# Patient Record
Sex: FEMALE | Race: ASIAN | NOT HISPANIC OR LATINO | URBAN - METROPOLITAN AREA
[De-identification: names, ages, dates, MRNs, and addresses within clinical notes are randomized per-mention and may not be internally consistent; named-entity substitution may affect disease eponyms.]

---

## 2017-04-27 ENCOUNTER — INPATIENT (INPATIENT)
Facility: HOSPITAL | Age: 77
LOS: 1 days | Discharge: HOME | End: 2017-04-29
Attending: INTERNAL MEDICINE | Admitting: INTERNAL MEDICINE
Payer: MEDICARE

## 2017-04-27 VITALS
RESPIRATION RATE: 17 BRPM | TEMPERATURE: 99 F | HEART RATE: 78 BPM | OXYGEN SATURATION: 98 % | SYSTOLIC BLOOD PRESSURE: 121 MMHG | DIASTOLIC BLOOD PRESSURE: 58 MMHG

## 2017-04-27 LAB
ALBUMIN SERPL ELPH-MCNC: 4 G/DL — SIGNIFICANT CHANGE UP (ref 3.3–5)
ALP SERPL-CCNC: 60 U/L — SIGNIFICANT CHANGE UP (ref 40–120)
ALT FLD-CCNC: 13 U/L — SIGNIFICANT CHANGE UP (ref 4–33)
APTT BLD: 29.8 SEC — SIGNIFICANT CHANGE UP (ref 27.5–37.4)
AST SERPL-CCNC: 22 U/L — SIGNIFICANT CHANGE UP (ref 4–32)
BASOPHILS # BLD AUTO: 0.02 K/UL — SIGNIFICANT CHANGE UP (ref 0–0.2)
BASOPHILS NFR BLD AUTO: 0.1 % — SIGNIFICANT CHANGE UP (ref 0–2)
BILIRUB SERPL-MCNC: 0.2 MG/DL — SIGNIFICANT CHANGE UP (ref 0.2–1.2)
BUN SERPL-MCNC: 19 MG/DL — SIGNIFICANT CHANGE UP (ref 7–23)
CALCIUM SERPL-MCNC: 9.2 MG/DL — SIGNIFICANT CHANGE UP (ref 8.4–10.5)
CHLORIDE SERPL-SCNC: 102 MMOL/L — SIGNIFICANT CHANGE UP (ref 98–107)
CK MB BLD-MCNC: 1.43 NG/ML — SIGNIFICANT CHANGE UP (ref 1–4.7)
CO2 SERPL-SCNC: 25 MMOL/L — SIGNIFICANT CHANGE UP (ref 22–31)
CREAT SERPL-MCNC: 0.88 MG/DL — SIGNIFICANT CHANGE UP (ref 0.5–1.3)
EOSINOPHIL # BLD AUTO: 0.23 K/UL — SIGNIFICANT CHANGE UP (ref 0–0.5)
EOSINOPHIL NFR BLD AUTO: 1.7 % — SIGNIFICANT CHANGE UP (ref 0–6)
GLUCOSE SERPL-MCNC: 184 MG/DL — HIGH (ref 70–99)
HCT VFR BLD CALC: 39.7 % — SIGNIFICANT CHANGE UP (ref 34.5–45)
HGB BLD-MCNC: 12.9 G/DL — SIGNIFICANT CHANGE UP (ref 11.5–15.5)
IMM GRANULOCYTES NFR BLD AUTO: 0.4 % — SIGNIFICANT CHANGE UP (ref 0–1.5)
INR BLD: 1.1 — SIGNIFICANT CHANGE UP (ref 0.88–1.17)
LYMPHOCYTES # BLD AUTO: 2.95 K/UL — SIGNIFICANT CHANGE UP (ref 1–3.3)
LYMPHOCYTES # BLD AUTO: 21.9 % — SIGNIFICANT CHANGE UP (ref 13–44)
MCHC RBC-ENTMCNC: 25.8 PG — LOW (ref 27–34)
MCHC RBC-ENTMCNC: 32.5 % — SIGNIFICANT CHANGE UP (ref 32–36)
MCV RBC AUTO: 79.4 FL — LOW (ref 80–100)
MONOCYTES # BLD AUTO: 0.85 K/UL — SIGNIFICANT CHANGE UP (ref 0–0.9)
MONOCYTES NFR BLD AUTO: 6.3 % — SIGNIFICANT CHANGE UP (ref 2–14)
NEUTROPHILS # BLD AUTO: 9.4 K/UL — HIGH (ref 1.8–7.4)
NEUTROPHILS NFR BLD AUTO: 69.6 % — SIGNIFICANT CHANGE UP (ref 43–77)
PLATELET # BLD AUTO: 309 K/UL — SIGNIFICANT CHANGE UP (ref 150–400)
PMV BLD: 10.1 FL — SIGNIFICANT CHANGE UP (ref 7–13)
POTASSIUM SERPL-MCNC: 4.3 MMOL/L — SIGNIFICANT CHANGE UP (ref 3.5–5.3)
POTASSIUM SERPL-SCNC: 4.3 MMOL/L — SIGNIFICANT CHANGE UP (ref 3.5–5.3)
PROT SERPL-MCNC: 7.1 G/DL — SIGNIFICANT CHANGE UP (ref 6–8.3)
PROTHROM AB SERPL-ACNC: 12.4 SEC — SIGNIFICANT CHANGE UP (ref 9.8–13.1)
RBC # BLD: 5 M/UL — SIGNIFICANT CHANGE UP (ref 3.8–5.2)
RBC # FLD: 15.9 % — HIGH (ref 10.3–14.5)
SODIUM SERPL-SCNC: 142 MMOL/L — SIGNIFICANT CHANGE UP (ref 135–145)
TROPONIN T SERPL-MCNC: < 0.06 NG/ML — SIGNIFICANT CHANGE UP (ref 0–0.06)
WBC # BLD: 13.5 K/UL — HIGH (ref 3.8–10.5)
WBC # FLD AUTO: 13.5 K/UL — HIGH (ref 3.8–10.5)

## 2017-04-27 NOTE — ED ADULT TRIAGE NOTE - CHIEF COMPLAINT QUOTE
Pt brought in by [facility] c/o burning sensation in chest and epigastric pain with vomiting. Pt reports " after eating salmon she began feeling dizzy, weak and having severe mid chest burning.   Pt received 2 baby aspirin, 1 nitro spray and 4 mg Zofran from EMS.  Pt brought in by [facility] c/o burning sensation in chest and epigastric pain with vomiting. Pt reports " after eating salmon she began feeling dizzy, weak and having severe mid chest burning.   Pt received 2 baby aspirin, 1 nitro spray and 4 mg Zofran from EMS.   @5485. Pt c/o worsening chest pain/ burning. Charge RN aware. EKG done.

## 2017-04-27 NOTE — ED ADULT NURSE NOTE - CHIEF COMPLAINT QUOTE
Pt brought in by [facility] c/o burning sensation in chest and epigastric pain with vomiting. Pt reports " after eating salmon she began feeling dizzy, weak and having severe mid chest burning.   Pt received 2 baby aspirin, 1 nitro spray and 4 mg Zofran from EMS.   @0268. Pt c/o worsening chest pain/ burning. Charge RN aware. EKG done.

## 2017-04-27 NOTE — ED ADULT NURSE NOTE - OBJECTIVE STATEMENT
Penny RN:. 77 yo female received in spot 17.  Pt is A&Ox4.  Pt BIBA c/o burning sensation in chest.  Pt states she has never had pain like this.  Pt reports "after eating salmon she began feeling dizzy, weak and having severe mid chest burning."  Other family members ate same food with no symptoms.  Pt was seen at [facility] 4 weeks ago with "infected diverticula."  as per pt son, pt developed cdiff from abx.  pt had stool sample test this week at PCP that showed no cdiff.  Pt denies any diarrhea at this time.  Pt received 2 baby aspirin, 1 nitro spray and 4 mg Zofran from EMS.  rpt to prim RN

## 2017-04-28 DIAGNOSIS — E11.9 TYPE 2 DIABETES MELLITUS WITHOUT COMPLICATIONS: ICD-10-CM

## 2017-04-28 DIAGNOSIS — I24.9 ACUTE ISCHEMIC HEART DISEASE, UNSPECIFIED: ICD-10-CM

## 2017-04-28 DIAGNOSIS — Z79.899 OTHER LONG TERM (CURRENT) DRUG THERAPY: ICD-10-CM

## 2017-04-28 DIAGNOSIS — K21.9 GASTRO-ESOPHAGEAL REFLUX DISEASE WITHOUT ESOPHAGITIS: ICD-10-CM

## 2017-04-28 DIAGNOSIS — R07.9 CHEST PAIN, UNSPECIFIED: ICD-10-CM

## 2017-04-28 DIAGNOSIS — Z29.9 ENCOUNTER FOR PROPHYLACTIC MEASURES, UNSPECIFIED: ICD-10-CM

## 2017-04-28 DIAGNOSIS — I10 ESSENTIAL (PRIMARY) HYPERTENSION: ICD-10-CM

## 2017-04-28 DIAGNOSIS — E78.5 HYPERLIPIDEMIA, UNSPECIFIED: ICD-10-CM

## 2017-04-28 LAB
BUN SERPL-MCNC: 26 MG/DL — HIGH (ref 7–23)
CALCIUM SERPL-MCNC: 8.8 MG/DL — SIGNIFICANT CHANGE UP (ref 8.4–10.5)
CHLORIDE SERPL-SCNC: 102 MMOL/L — SIGNIFICANT CHANGE UP (ref 98–107)
CHOLEST SERPL-MCNC: 176 MG/DL — SIGNIFICANT CHANGE UP (ref 120–199)
CK MB BLD-MCNC: 1.34 NG/ML — SIGNIFICANT CHANGE UP (ref 1–4.7)
CK SERPL-CCNC: 33 U/L — SIGNIFICANT CHANGE UP (ref 25–170)
CO2 SERPL-SCNC: 22 MMOL/L — SIGNIFICANT CHANGE UP (ref 22–31)
CREAT SERPL-MCNC: 0.81 MG/DL — SIGNIFICANT CHANGE UP (ref 0.5–1.3)
GLUCOSE SERPL-MCNC: 130 MG/DL — HIGH (ref 70–99)
HBA1C BLD-MCNC: 7.1 % — HIGH (ref 4–5.6)
HCT VFR BLD CALC: 37.7 % — SIGNIFICANT CHANGE UP (ref 34.5–45)
HDLC SERPL-MCNC: 42 MG/DL — LOW (ref 45–65)
HGB BLD-MCNC: 12.3 G/DL — SIGNIFICANT CHANGE UP (ref 11.5–15.5)
LIPID PNL WITH DIRECT LDL SERPL: 116 MG/DL — SIGNIFICANT CHANGE UP
MCHC RBC-ENTMCNC: 25.9 PG — LOW (ref 27–34)
MCHC RBC-ENTMCNC: 32.6 % — SIGNIFICANT CHANGE UP (ref 32–36)
MCV RBC AUTO: 79.5 FL — LOW (ref 80–100)
PLATELET # BLD AUTO: 300 K/UL — SIGNIFICANT CHANGE UP (ref 150–400)
PMV BLD: 10.3 FL — SIGNIFICANT CHANGE UP (ref 7–13)
POTASSIUM SERPL-MCNC: 4.3 MMOL/L — SIGNIFICANT CHANGE UP (ref 3.5–5.3)
POTASSIUM SERPL-SCNC: 4.3 MMOL/L — SIGNIFICANT CHANGE UP (ref 3.5–5.3)
RBC # BLD: 4.74 M/UL — SIGNIFICANT CHANGE UP (ref 3.8–5.2)
RBC # FLD: 15.9 % — HIGH (ref 10.3–14.5)
SODIUM SERPL-SCNC: 140 MMOL/L — SIGNIFICANT CHANGE UP (ref 135–145)
TRIGL SERPL-MCNC: 183 MG/DL — HIGH (ref 10–149)
TROPONIN T SERPL-MCNC: < 0.06 NG/ML — SIGNIFICANT CHANGE UP (ref 0–0.06)
TSH SERPL-MCNC: 1.07 UIU/ML — SIGNIFICANT CHANGE UP (ref 0.27–4.2)
WBC # BLD: 10.7 K/UL — HIGH (ref 3.8–10.5)
WBC # FLD AUTO: 10.7 K/UL — HIGH (ref 3.8–10.5)

## 2017-04-28 PROCEDURE — 71020: CPT | Mod: 26

## 2017-04-28 PROCEDURE — 93458 L HRT ARTERY/VENTRICLE ANGIO: CPT | Mod: 26

## 2017-04-28 RX ORDER — ATENOLOL 25 MG/1
12.5 TABLET ORAL AT BEDTIME
Qty: 0 | Refills: 0 | Status: DISCONTINUED | OUTPATIENT
Start: 2017-04-28 | End: 2017-04-28

## 2017-04-28 RX ORDER — ONDANSETRON 8 MG/1
4 TABLET, FILM COATED ORAL ONCE
Qty: 0 | Refills: 0 | Status: COMPLETED | OUTPATIENT
Start: 2017-04-28 | End: 2017-04-28

## 2017-04-28 RX ORDER — ASPIRIN/CALCIUM CARB/MAGNESIUM 324 MG
81 TABLET ORAL DAILY
Qty: 0 | Refills: 0 | Status: DISCONTINUED | OUTPATIENT
Start: 2017-04-28 | End: 2017-04-29

## 2017-04-28 RX ORDER — LISINOPRIL 2.5 MG/1
10 TABLET ORAL DAILY
Qty: 0 | Refills: 0 | Status: DISCONTINUED | OUTPATIENT
Start: 2017-04-28 | End: 2017-04-29

## 2017-04-28 RX ORDER — INSULIN LISPRO 100/ML
VIAL (ML) SUBCUTANEOUS
Qty: 0 | Refills: 0 | Status: DISCONTINUED | OUTPATIENT
Start: 2017-04-28 | End: 2017-04-29

## 2017-04-28 RX ORDER — DEXTROSE 50 % IN WATER 50 %
12.5 SYRINGE (ML) INTRAVENOUS ONCE
Qty: 0 | Refills: 0 | Status: DISCONTINUED | OUTPATIENT
Start: 2017-04-28 | End: 2017-04-29

## 2017-04-28 RX ORDER — DEXTROSE 50 % IN WATER 50 %
25 SYRINGE (ML) INTRAVENOUS ONCE
Qty: 0 | Refills: 0 | Status: DISCONTINUED | OUTPATIENT
Start: 2017-04-28 | End: 2017-04-29

## 2017-04-28 RX ORDER — DEXTROSE 50 % IN WATER 50 %
1 SYRINGE (ML) INTRAVENOUS ONCE
Qty: 0 | Refills: 0 | Status: DISCONTINUED | OUTPATIENT
Start: 2017-04-28 | End: 2017-04-29

## 2017-04-28 RX ORDER — GLUCAGON INJECTION, SOLUTION 0.5 MG/.1ML
1 INJECTION, SOLUTION SUBCUTANEOUS ONCE
Qty: 0 | Refills: 0 | Status: DISCONTINUED | OUTPATIENT
Start: 2017-04-28 | End: 2017-04-29

## 2017-04-28 RX ORDER — SIMVASTATIN 20 MG/1
40 TABLET, FILM COATED ORAL AT BEDTIME
Qty: 0 | Refills: 0 | Status: DISCONTINUED | OUTPATIENT
Start: 2017-04-28 | End: 2017-04-29

## 2017-04-28 RX ORDER — ATENOLOL 25 MG/1
12.5 TABLET ORAL DAILY
Qty: 0 | Refills: 0 | Status: DISCONTINUED | OUTPATIENT
Start: 2017-04-28 | End: 2017-04-29

## 2017-04-28 RX ORDER — ATENOLOL 25 MG/1
0.5 TABLET ORAL
Qty: 0 | Refills: 0 | COMMUNITY

## 2017-04-28 RX ORDER — SODIUM CHLORIDE 9 MG/ML
1000 INJECTION, SOLUTION INTRAVENOUS
Qty: 0 | Refills: 0 | Status: DISCONTINUED | OUTPATIENT
Start: 2017-04-28 | End: 2017-04-29

## 2017-04-28 RX ORDER — INSULIN LISPRO 100/ML
VIAL (ML) SUBCUTANEOUS AT BEDTIME
Qty: 0 | Refills: 0 | Status: DISCONTINUED | OUTPATIENT
Start: 2017-04-28 | End: 2017-04-29

## 2017-04-28 RX ADMIN — Medication 81 MILLIGRAM(S): at 11:50

## 2017-04-28 RX ADMIN — LISINOPRIL 10 MILLIGRAM(S): 2.5 TABLET ORAL at 05:05

## 2017-04-28 RX ADMIN — ATENOLOL 12.5 MILLIGRAM(S): 25 TABLET ORAL at 06:15

## 2017-04-28 RX ADMIN — ONDANSETRON 4 MILLIGRAM(S): 8 TABLET, FILM COATED ORAL at 00:58

## 2017-04-28 RX ADMIN — SIMVASTATIN 40 MILLIGRAM(S): 20 TABLET, FILM COATED ORAL at 21:20

## 2017-04-28 NOTE — H&P ADULT. - PROBLEM SELECTOR PLAN 1
Admit to telemetry.   Trend CE. Consider ischemic evaluation.   EKG, CXR.  check cbc, tsh, lipid, hemoglobin a1c, bmp with mag and phos.   f/u MD note

## 2017-04-28 NOTE — H&P ADULT. - ASSESSMENT
75 y/o F with h/o DM, HTN, HLD, arthritis, depressiono, GERD prseents to the ED for chest pain. Admit to telemetry to r/o ACS.

## 2017-04-28 NOTE — H&P ADULT. - GASTROINTESTINAL DETAILS
no guarding/bowel sounds normal/normal/no rebound tenderness/nontender/no distention/no masses palpable/soft

## 2017-04-28 NOTE — H&P ADULT. - HISTORY OF PRESENT ILLNESS
75 y/o F with h/o DM, HLD, HTN, Depression , arthritis, GERD presents to the ED for chest pain. Pt states the chest pain when she started eating dinner. Pt states the chest pain is on the midsternal chest radiating to the left side. Pt states it is associated with nausea and sweats. Pt denies LOC, syncope, fever, chills, shortness of breath, abdominal pain, numbness, tingling, dysuria, urinary/bowel incontinence or any other complaints at this time. 75 y/o F with h/o DM, HLD, HTN, Depression , arthritis, GERD presents to the ED for chest pain. Pt states the chest pain when she started eating dinner. Pt states the chest pain is on the midsternal chest radiating to the left side. Pt states it is associated with nausea and sweats. Pt states symptoms lasted around 2 hours. Pt denies LOC, syncope, fever, chills, shortness of breath, abdominal pain, numbness, tingling, dysuria, urinary/bowel incontinence or any other complaints at this time.

## 2017-04-28 NOTE — H&P ADULT. - RS GEN PE MLT RESP DETAILS PC
airway patent/no chest wall tenderness/clear to auscultation bilaterally/breath sounds equal/normal/no intercostal retractions/good air movement/no rhonchi/no rales/no wheezes/respirations non-labored

## 2017-04-28 NOTE — ED PROVIDER NOTE - ATTENDING CONTRIBUTION TO CARE
Attending Note: I have discussed with the resident their history, assessment, physical and examination. I agree with their conclusion and proposed medical plan. I have personally examined and interviewed the patient.

## 2017-04-28 NOTE — ED PROVIDER NOTE - PHYSICAL EXAMINATION
Attending Note: 75 y/o female p/w c/o sudden onset midsternal sharp cp radiating to left chest a/w tachypnea, nausea and diaphoresis/flushing. PMH DM, HTN, HLD, no personal or fam h/o Cardiac dz (neg cath 2013 per pt), no primary cardiologist. Symptoms occurred at a wedding while pt was eating dinner. Other family members ate same meal and had no symptoms. No abd pain, diarrhea, or vomiting. First episode. Received asa x 2, nitro by ems with subsequent improvement of symptoms. Symptoms were persistent x 1.5hrs. resolved upon ED arrival. Now asymptomatic.

## 2017-04-28 NOTE — ED PROVIDER NOTE - OBJECTIVE STATEMENT
76yF hx DM, HTN, HLD, no personal or fam hx Cardiac dz (neg cath 2013 per pt), no primary cardiologist  p/w sudden onset midsternal sharp cp radiating to left chest a/w tachypnea, nausea and diaphoresis/flushing. Symptoms occurred at a wedding while pt was eating dinner (salmon). Other family members ate salmon and had no symptoms. Pt had no abd pain, diarrhea, or vomiting. Has never had pain like this in past. Received asax2, nitro by ems with subsequent improvement of symptoms. Symptoms were persistent x 1.5hrs. resolved upon ed arrival. Now asymptomatic.

## 2017-04-28 NOTE — H&P ADULT. - NEGATIVE NEUROLOGICAL SYMPTOMS
no loss of sensation/no confusion/no paresthesias/no syncope/no tremors/no difficulty walking/no weakness/no generalized seizures/no vertigo/no loss of consciousness/no facial palsy/no hemiparesis/no headache/no focal seizures/no transient paralysis

## 2017-04-28 NOTE — H&P ADULT. - NEGATIVE MUSCULOSKELETAL SYMPTOMS
no joint swelling/no neck pain/no back pain/no leg pain R/no stiffness/no muscle cramps/no arthralgia/no leg pain L/no arthritis/no myalgia/no muscle weakness/no arm pain L/no arm pain R

## 2017-04-28 NOTE — H&P ADULT. - PROBLEM SELECTOR PLAN 6
Patient doesn't remember all her medications. Pharmacy is closed and will need to verify in the morning.

## 2017-04-28 NOTE — H&P ADULT. - NEGATIVE GASTROINTESTINAL SYMPTOMS
no abdominal pain/no steatorrhea/no jaundice/no vomiting/no flatulence/no change in bowel habits/no hiccoughs/no diarrhea/no hematochezia/no melena/no constipation

## 2017-04-28 NOTE — ED PROVIDER NOTE - PSH
H/O: hysterectomy    S/P laparoscopic cholecystectomy    S/P left shoulder surgery    S/P left TKR (total knee replacement)

## 2017-04-28 NOTE — H&P ADULT. - NEGATIVE CARDIOVASCULAR SYMPTOMS
no peripheral edema/no dyspnea on exertion/no orthopnea/no paroxysmal nocturnal dyspnea/no palpitations/no claudication

## 2017-04-28 NOTE — ED PROVIDER NOTE - PMH
Arthritis    Depression    DM (diabetes mellitus)    GERD (gastroesophageal reflux disease)    HLD (hyperlipidemia)    HTN (hypertension)

## 2017-04-29 VITALS
OXYGEN SATURATION: 100 % | DIASTOLIC BLOOD PRESSURE: 73 MMHG | RESPIRATION RATE: 18 BRPM | TEMPERATURE: 98 F | SYSTOLIC BLOOD PRESSURE: 138 MMHG | HEART RATE: 73 BPM

## 2017-04-29 LAB
BUN SERPL-MCNC: 26 MG/DL — HIGH (ref 7–23)
CALCIUM SERPL-MCNC: 9 MG/DL — SIGNIFICANT CHANGE UP (ref 8.4–10.5)
CHLORIDE SERPL-SCNC: 104 MMOL/L — SIGNIFICANT CHANGE UP (ref 98–107)
CO2 SERPL-SCNC: 25 MMOL/L — SIGNIFICANT CHANGE UP (ref 22–31)
CREAT SERPL-MCNC: 1 MG/DL — SIGNIFICANT CHANGE UP (ref 0.5–1.3)
GLUCOSE SERPL-MCNC: 116 MG/DL — HIGH (ref 70–99)
HCT VFR BLD CALC: 39.2 % — SIGNIFICANT CHANGE UP (ref 34.5–45)
HGB BLD-MCNC: 12.5 G/DL — SIGNIFICANT CHANGE UP (ref 11.5–15.5)
MAGNESIUM SERPL-MCNC: 2 MG/DL — SIGNIFICANT CHANGE UP (ref 1.6–2.6)
MCHC RBC-ENTMCNC: 25.8 PG — LOW (ref 27–34)
MCHC RBC-ENTMCNC: 31.9 % — LOW (ref 32–36)
MCV RBC AUTO: 80.8 FL — SIGNIFICANT CHANGE UP (ref 80–100)
PLATELET # BLD AUTO: 318 K/UL — SIGNIFICANT CHANGE UP (ref 150–400)
PMV BLD: 10.3 FL — SIGNIFICANT CHANGE UP (ref 7–13)
POTASSIUM SERPL-MCNC: 4.5 MMOL/L — SIGNIFICANT CHANGE UP (ref 3.5–5.3)
POTASSIUM SERPL-SCNC: 4.5 MMOL/L — SIGNIFICANT CHANGE UP (ref 3.5–5.3)
RBC # BLD: 4.85 M/UL — SIGNIFICANT CHANGE UP (ref 3.8–5.2)
RBC # FLD: 16.2 % — HIGH (ref 10.3–14.5)
SODIUM SERPL-SCNC: 142 MMOL/L — SIGNIFICANT CHANGE UP (ref 135–145)
WBC # BLD: 10.26 K/UL — SIGNIFICANT CHANGE UP (ref 3.8–10.5)
WBC # FLD AUTO: 10.26 K/UL — SIGNIFICANT CHANGE UP (ref 3.8–10.5)

## 2017-04-29 RX ADMIN — Medication 2: at 11:43

## 2017-04-29 RX ADMIN — Medication 81 MILLIGRAM(S): at 11:43

## 2017-04-29 NOTE — DISCHARGE NOTE ADULT - CARE PROVIDER_API CALL
Chuy Salazar (), Cardiology; Internal Medicine  [address] [place] [address] [address]  [place] [address]  Phone: [phone]  Fax: [phone]

## 2017-04-29 NOTE — DISCHARGE NOTE ADULT - PATIENT PORTAL LINK FT
“You can access the [facility] Patient Portal, offered by [facility], by registering with the following website: [email or web address]”

## 2017-04-29 NOTE — DISCHARGE NOTE ADULT - ADDITIONAL INSTRUCTIONS
Follow up with Cardiologist and PMD within one week of discharge. Call for appointment. Return to ED for any concerning symptoms. Continue medications as prescribed. Low salt, low fat, low cholesterol diet. No heavy lifting x one week. No strenuous activity x 3 weeks. Monitor site of procedure and notify your doctor for any redness, swelling, discharge. No driving x 24 hours. You may shower but no baths or swimming x one week.

## 2017-04-29 NOTE — DISCHARGE NOTE ADULT - CARE PLAN
Principal Discharge DX:	Chest pain  Goal:	Followup with PMD and take all medications prescribed.  Instructions for follow-up, activity and diet:	Followup with PMD and take all medications prescribed. Low salt, low fat, low cholesterol, diabetic diet.  Secondary Diagnosis:	HTN (hypertension)  Goal:	Followup with PMD and take all medications prescribed.  Instructions for follow-up, activity and diet:	Followup with PMD and take all medications prescribed. Low salt, low fat, low cholesterol, diabetic diet.  Secondary Diagnosis:	CAD (coronary artery disease)  Goal:	Followup with PMD and take all medications prescribed.  Instructions for follow-up, activity and diet:	Followup with PMD and take all medications prescribed. Low salt, low fat, low cholesterol, diabetic diet.  Secondary Diagnosis:	HLD (hyperlipidemia)  Goal:	Followup with PMD and take all medications prescribed.  Instructions for follow-up, activity and diet:	Followup with PMD and take all medications prescribed. Low salt, low fat, low cholesterol, diabetic diet.

## 2017-04-29 NOTE — DISCHARGE NOTE ADULT - HOSPITAL COURSE
75 y/o F with h/o DM, HTN, HLD, arthritis, depression,  GERD presents  to the ED for chest pain. Admit to telemetry to r/o ACS.  WBC 13.50  CEX2: negative  EKG: NSR @ 71 bpm, TWI in AVL, V1-V2   HbA1C 7.1  4/28 CATH: mLAD luminal irregularities; RFA sheath removed   4/29- As per attending, patient stable for discharge.

## 2017-04-29 NOTE — DISCHARGE NOTE ADULT - MEDICATION SUMMARY - MEDICATIONS TO TAKE
I will START or STAY ON the medications listed below when I get home from the hospital:    aspirin 81 mg oral tablet  -- 1 tab(s) by mouth once a day  -- Indication: For CAD    lisinopril 10 mg oral tablet  -- 1 tab(s) by mouth once a day  -- Indication: For HTN (hypertension)    simvastatin 40 mg oral tablet  -- 1 tab(s) by mouth once a day (at bedtime)  -- Indication: For HLD (hyperlipidemia)    atenolol 25 mg oral tablet  -- 0.5 tab(s) by mouth once a day (at bedtime)  -- Indication: For HTN (hypertension) I will START or STAY ON the medications listed below when I get home from the hospital:    aspirin 81 mg oral tablet  -- 1 tab(s) by mouth once a day  -- Indication: For CAD (coronary artery disease)    lisinopril 10 mg oral tablet  -- 1 tab(s) by mouth once a day  -- Indication: For HTN (hypertension)    simvastatin 40 mg oral tablet  -- 1 tab(s) by mouth once a day (at bedtime)  -- Indication: For HLD (hyperlipidemia)    atenolol 25 mg oral tablet  -- 0.5 tab(s) by mouth once a day (at bedtime)  -- Indication: For HTN (hypertension)

## 2018-01-01 ENCOUNTER — OUTPATIENT (OUTPATIENT)
Dept: OUTPATIENT SERVICES | Facility: HOSPITAL | Age: 78
LOS: 1 days | End: 2018-01-01
Payer: MEDICAID

## 2018-01-01 PROCEDURE — G9001: CPT

## 2018-01-24 ENCOUNTER — EMERGENCY (EMERGENCY)
Facility: HOSPITAL | Age: 78
LOS: 1 days | Discharge: HOME | End: 2018-01-24
Attending: EMERGENCY MEDICINE | Admitting: EMERGENCY MEDICINE
Payer: MEDICARE

## 2018-01-24 VITALS
OXYGEN SATURATION: 100 % | RESPIRATION RATE: 16 BRPM | SYSTOLIC BLOOD PRESSURE: 132 MMHG | DIASTOLIC BLOOD PRESSURE: 84 MMHG | HEART RATE: 74 BPM | TEMPERATURE: 99 F

## 2018-01-24 VITALS
OXYGEN SATURATION: 100 % | DIASTOLIC BLOOD PRESSURE: 63 MMHG | SYSTOLIC BLOOD PRESSURE: 148 MMHG | RESPIRATION RATE: 17 BRPM | HEART RATE: 78 BPM

## 2018-01-24 LAB
ALBUMIN SERPL ELPH-MCNC: 3.5 G/DL — SIGNIFICANT CHANGE UP (ref 3.3–5)
ALP SERPL-CCNC: 51 U/L — SIGNIFICANT CHANGE UP (ref 40–120)
ALT FLD-CCNC: 17 U/L — SIGNIFICANT CHANGE UP (ref 4–33)
APPEARANCE UR: CLEAR — SIGNIFICANT CHANGE UP
AST SERPL-CCNC: 44 U/L — HIGH (ref 4–32)
BASE EXCESS BLDV CALC-SCNC: 1.5 MMOL/L — SIGNIFICANT CHANGE UP
BASOPHILS # BLD AUTO: 0.04 K/UL — SIGNIFICANT CHANGE UP (ref 0–0.2)
BASOPHILS NFR BLD AUTO: 0.3 % — SIGNIFICANT CHANGE UP (ref 0–2)
BILIRUB SERPL-MCNC: 0.4 MG/DL — SIGNIFICANT CHANGE UP (ref 0.2–1.2)
BILIRUB UR-MCNC: NEGATIVE — SIGNIFICANT CHANGE UP
BLOOD GAS VENOUS - CREATININE: 0.94 MG/DL — SIGNIFICANT CHANGE UP (ref 0.5–1.3)
BLOOD UR QL VISUAL: NEGATIVE — SIGNIFICANT CHANGE UP
BUN SERPL-MCNC: 13 MG/DL — SIGNIFICANT CHANGE UP (ref 7–23)
CALCIUM SERPL-MCNC: 8.1 MG/DL — LOW (ref 8.4–10.5)
CHLORIDE BLDV-SCNC: 102 MMOL/L — SIGNIFICANT CHANGE UP (ref 96–108)
CHLORIDE SERPL-SCNC: 100 MMOL/L — SIGNIFICANT CHANGE UP (ref 98–107)
CO2 SERPL-SCNC: 22 MMOL/L — SIGNIFICANT CHANGE UP (ref 22–31)
COLOR SPEC: YELLOW — SIGNIFICANT CHANGE UP
CREAT SERPL-MCNC: 1.09 MG/DL — SIGNIFICANT CHANGE UP (ref 0.5–1.3)
EOSINOPHIL # BLD AUTO: 0.08 K/UL — SIGNIFICANT CHANGE UP (ref 0–0.5)
EOSINOPHIL NFR BLD AUTO: 0.7 % — SIGNIFICANT CHANGE UP (ref 0–6)
GAS PNL BLDV: 132 MMOL/L — LOW (ref 136–146)
GLUCOSE BLDV-MCNC: 117 — HIGH (ref 70–99)
GLUCOSE SERPL-MCNC: 110 MG/DL — HIGH (ref 70–99)
GLUCOSE UR-MCNC: NEGATIVE — SIGNIFICANT CHANGE UP
HCO3 BLDV-SCNC: 24 MMOL/L — SIGNIFICANT CHANGE UP (ref 20–27)
HCT VFR BLD CALC: 42.3 % — SIGNIFICANT CHANGE UP (ref 34.5–45)
HCT VFR BLDV CALC: 43.4 % — SIGNIFICANT CHANGE UP (ref 34.5–45)
HGB BLD-MCNC: 14.2 G/DL — SIGNIFICANT CHANGE UP (ref 11.5–15.5)
HGB BLDV-MCNC: 14.1 G/DL — SIGNIFICANT CHANGE UP (ref 11.5–15.5)
HYALINE CASTS # UR AUTO: SIGNIFICANT CHANGE UP (ref 0–?)
IMM GRANULOCYTES # BLD AUTO: 0.03 # — SIGNIFICANT CHANGE UP
IMM GRANULOCYTES NFR BLD AUTO: 0.3 % — SIGNIFICANT CHANGE UP (ref 0–1.5)
KETONES UR-MCNC: NEGATIVE — SIGNIFICANT CHANGE UP
LACTATE BLDV-MCNC: 1.7 MMOL/L — SIGNIFICANT CHANGE UP (ref 0.5–2)
LEUKOCYTE ESTERASE UR-ACNC: HIGH
LIDOCAIN IGE QN: 45.1 U/L — SIGNIFICANT CHANGE UP (ref 7–60)
LYMPHOCYTES # BLD AUTO: 2.98 K/UL — SIGNIFICANT CHANGE UP (ref 1–3.3)
LYMPHOCYTES # BLD AUTO: 25.1 % — SIGNIFICANT CHANGE UP (ref 13–44)
MCHC RBC-ENTMCNC: 26.7 PG — LOW (ref 27–34)
MCHC RBC-ENTMCNC: 33.6 % — SIGNIFICANT CHANGE UP (ref 32–36)
MCV RBC AUTO: 79.7 FL — LOW (ref 80–100)
MONOCYTES # BLD AUTO: 1.12 K/UL — HIGH (ref 0–0.9)
MONOCYTES NFR BLD AUTO: 9.4 % — SIGNIFICANT CHANGE UP (ref 2–14)
MUCOUS THREADS # UR AUTO: SIGNIFICANT CHANGE UP
NEUTROPHILS # BLD AUTO: 7.61 K/UL — HIGH (ref 1.8–7.4)
NEUTROPHILS NFR BLD AUTO: 64.2 % — SIGNIFICANT CHANGE UP (ref 43–77)
NITRITE UR-MCNC: NEGATIVE — SIGNIFICANT CHANGE UP
NON-SQ EPI CELLS # UR AUTO: <1 — SIGNIFICANT CHANGE UP
NRBC # FLD: 0 — SIGNIFICANT CHANGE UP
PCO2 BLDV: 50 MMHG — SIGNIFICANT CHANGE UP (ref 41–51)
PH BLDV: 7.35 PH — SIGNIFICANT CHANGE UP (ref 7.32–7.43)
PH UR: 5.5 — SIGNIFICANT CHANGE UP (ref 4.6–8)
PLATELET # BLD AUTO: 269 K/UL — SIGNIFICANT CHANGE UP (ref 150–400)
PMV BLD: 10.2 FL — SIGNIFICANT CHANGE UP (ref 7–13)
PO2 BLDV: < 24 MMHG — LOW (ref 35–40)
POTASSIUM BLDV-SCNC: 6.7 MMOL/L — CRITICAL HIGH (ref 3.4–4.5)
POTASSIUM SERPL-MCNC: 5.3 MMOL/L — SIGNIFICANT CHANGE UP (ref 3.5–5.3)
POTASSIUM SERPL-SCNC: 5.3 MMOL/L — SIGNIFICANT CHANGE UP (ref 3.5–5.3)
PROT SERPL-MCNC: 6.9 G/DL — SIGNIFICANT CHANGE UP (ref 6–8.3)
PROT UR-MCNC: 20 MG/DL — SIGNIFICANT CHANGE UP
RBC # BLD: 5.31 M/UL — HIGH (ref 3.8–5.2)
RBC # FLD: 15.2 % — HIGH (ref 10.3–14.5)
RBC CASTS # UR COMP ASSIST: SIGNIFICANT CHANGE UP (ref 0–?)
SAO2 % BLDV: 39.9 % — LOW (ref 60–85)
SODIUM SERPL-SCNC: 135 MMOL/L — SIGNIFICANT CHANGE UP (ref 135–145)
SP GR SPEC: 1.02 — SIGNIFICANT CHANGE UP (ref 1–1.04)
SQUAMOUS # UR AUTO: SIGNIFICANT CHANGE UP
UROBILINOGEN FLD QL: NORMAL MG/DL — SIGNIFICANT CHANGE UP
WBC # BLD: 11.86 K/UL — HIGH (ref 3.8–10.5)
WBC # FLD AUTO: 11.86 K/UL — HIGH (ref 3.8–10.5)
WBC UR QL: HIGH (ref 0–?)

## 2018-01-24 PROCEDURE — 74177 CT ABD & PELVIS W/CONTRAST: CPT | Mod: 26

## 2018-01-24 PROCEDURE — 99284 EMERGENCY DEPT VISIT MOD MDM: CPT

## 2018-01-24 RX ORDER — SODIUM CHLORIDE 9 MG/ML
500 INJECTION INTRAMUSCULAR; INTRAVENOUS; SUBCUTANEOUS ONCE
Qty: 0 | Refills: 0 | Status: COMPLETED | OUTPATIENT
Start: 2018-01-24 | End: 2018-01-24

## 2018-01-24 RX ORDER — ONDANSETRON 8 MG/1
4 TABLET, FILM COATED ORAL ONCE
Qty: 0 | Refills: 0 | Status: COMPLETED | OUTPATIENT
Start: 2018-01-24 | End: 2018-01-24

## 2018-01-24 RX ORDER — MORPHINE SULFATE 50 MG/1
4 CAPSULE, EXTENDED RELEASE ORAL ONCE
Qty: 0 | Refills: 0 | Status: DISCONTINUED | OUTPATIENT
Start: 2018-01-24 | End: 2018-01-24

## 2018-01-24 RX ORDER — ONDANSETRON 8 MG/1
1 TABLET, FILM COATED ORAL
Qty: 15 | Refills: 0 | OUTPATIENT
Start: 2018-01-24 | End: 2018-01-28

## 2018-01-24 RX ORDER — IBUPROFEN 200 MG
600 TABLET ORAL ONCE
Qty: 0 | Refills: 0 | Status: DISCONTINUED | OUTPATIENT
Start: 2018-01-24 | End: 2018-01-28

## 2018-01-24 RX ADMIN — MORPHINE SULFATE 4 MILLIGRAM(S): 50 CAPSULE, EXTENDED RELEASE ORAL at 14:41

## 2018-01-24 RX ADMIN — MORPHINE SULFATE 4 MILLIGRAM(S): 50 CAPSULE, EXTENDED RELEASE ORAL at 12:20

## 2018-01-24 RX ADMIN — ONDANSETRON 4 MILLIGRAM(S): 8 TABLET, FILM COATED ORAL at 13:30

## 2018-01-24 RX ADMIN — MORPHINE SULFATE 4 MILLIGRAM(S): 50 CAPSULE, EXTENDED RELEASE ORAL at 13:30

## 2018-01-24 RX ADMIN — ONDANSETRON 4 MILLIGRAM(S): 8 TABLET, FILM COATED ORAL at 17:27

## 2018-01-24 RX ADMIN — MORPHINE SULFATE 4 MILLIGRAM(S): 50 CAPSULE, EXTENDED RELEASE ORAL at 14:16

## 2018-01-24 RX ADMIN — SODIUM CHLORIDE 500 MILLILITER(S): 9 INJECTION INTRAMUSCULAR; INTRAVENOUS; SUBCUTANEOUS at 12:20

## 2018-01-24 RX ADMIN — MORPHINE SULFATE 4 MILLIGRAM(S): 50 CAPSULE, EXTENDED RELEASE ORAL at 16:26

## 2018-01-24 NOTE — ED ADULT TRIAGE NOTE - CHIEF COMPLAINT QUOTE
Pt c/o abd pain x1 week.  +nausea.  +diarrhea and vomiting.  PMH diverticulitis.  Appears comfortable

## 2018-01-24 NOTE — ED ADULT NURSE NOTE - FALL HARM RISK TYPE OF ASSESSMENT
Daily Assessment Dr. Bentley's phone number at the [facility] is [phone]. Dr. Bentley's cell phone number IN CASE OF EMERGENCY is [phone]. Transplant unit phone number if you have questions or concerns is [phone]. Dr. Bentley's phone number at the [facility] is [phone]. Dr. Bentley's cell phone number IN CASE OF EMERGENCY is [phone]. Transplant unit phone number if you have questions or concerns is [phone].  You have a follow up appointment with Margo Burk NP on Friday, 1/6/17 at 11am. At that time, you will have your labs drawn. If you need platelets, you have a possible platelet appointment at 2pm.

## 2018-01-24 NOTE — ED PROVIDER NOTE - ATTENDING CONTRIBUTION TO CARE
ED Attending (Jakob COLBY): I have personally performed a face to face bedside history and physical examination of this patient. I have discussed the history, examination, assessment and plan of management with the Physician Assistant. My findings include: 76 y/o female with PMHx of HTN, HLD, DM (diet controlled), diverticulitis here for eval of LLQ x 2 wks. Pt states that she has been having increasing pain, initially dull and intermittent, now constant, increased associated with nausea but no vomiting. Pt denies any fever but admits to chills, also reports poor PO intake as she is afraid to eat. Pt saw GI doc last week (Dr Briceño) and was rxed cipro and flagyl which she has been taking, however admits to skipping the dosages as she has not been able to tolerate it. (-) urinary sx, denies diarrhea or constipation. sx feel like sx pt experienced with diverticulitis On exam: in pain. T99.2, lungs and heart normal, abdomen + BS, + LLQ tenderness. RO diverticulitis/abscess. Plan: labs cultures UA analgesia, antiemetic, IVF, CT abdomen

## 2018-01-24 NOTE — ED PROVIDER NOTE - MEDICAL DECISION MAKING DETAILS
LLQ pain concern for diverticulitis vs abscess - labs, pain control, IV hydration, Ct abd pelvis will keep npo.

## 2018-01-24 NOTE — ED ADULT NURSE NOTE - OBJECTIVE STATEMENT
pt alert oriented x 3 pt c/o abdominal pain several days feels nauseous SL placed admission labs sent vitals as stated NSR on monitor pt given morphine as ordered for pain 7/10 NS infusing well MD evaluated

## 2018-01-25 DIAGNOSIS — R69 ILLNESS, UNSPECIFIED: ICD-10-CM

## 2018-01-25 LAB
BACTERIA UR CULT: SIGNIFICANT CHANGE UP
SPECIMEN SOURCE: SIGNIFICANT CHANGE UP

## 2018-12-27 NOTE — ED ADULT TRIAGE NOTE - AS TEMP SITE
Cerumen impaction left ear debrided with the microscope and suctions and curettes. Ear canals and eardrums otherwise look normal. This took typical effort and 5 minutes.
oral

## 2019-04-16 ENCOUNTER — APPOINTMENT (OUTPATIENT)
Dept: ORTHOPEDIC SURGERY | Facility: CLINIC | Age: 79
End: 2019-04-16
Payer: MEDICARE

## 2019-04-16 VITALS — SYSTOLIC BLOOD PRESSURE: 150 MMHG | DIASTOLIC BLOOD PRESSURE: 81 MMHG | HEART RATE: 84 BPM

## 2019-04-16 VITALS — WEIGHT: 165 LBS | HEIGHT: 59 IN | BODY MASS INDEX: 33.26 KG/M2

## 2019-04-16 DIAGNOSIS — Z86.39 PERSONAL HISTORY OF OTHER ENDOCRINE, NUTRITIONAL AND METABOLIC DISEASE: ICD-10-CM

## 2019-04-16 DIAGNOSIS — M65.331 TRIGGER FINGER, RIGHT MIDDLE FINGER: ICD-10-CM

## 2019-04-16 DIAGNOSIS — M18.12 UNILATERAL PRIMARY OSTEOARTHRITIS OF FIRST CARPOMETACARPAL JOINT, LEFT HAND: ICD-10-CM

## 2019-04-16 DIAGNOSIS — Z86.79 PERSONAL HISTORY OF OTHER DISEASES OF THE CIRCULATORY SYSTEM: ICD-10-CM

## 2019-04-16 DIAGNOSIS — M65.311 TRIGGER THUMB, RIGHT THUMB: ICD-10-CM

## 2019-04-16 DIAGNOSIS — Z78.9 OTHER SPECIFIED HEALTH STATUS: ICD-10-CM

## 2019-04-16 PROCEDURE — 99203 OFFICE O/P NEW LOW 30 MIN: CPT | Mod: 25

## 2019-04-16 PROCEDURE — 20550 NJX 1 TENDON SHEATH/LIGAMENT: CPT | Mod: F5

## 2019-04-16 RX ORDER — LINAGLIPTIN 5 MG/1
TABLET, FILM COATED ORAL
Refills: 0 | Status: ACTIVE | COMMUNITY

## 2019-04-16 RX ORDER — ASPIRIN/ACETAMINOPHEN/CAFFEINE 500-325-65
POWDER IN PACKET (EA) ORAL
Refills: 0 | Status: ACTIVE | COMMUNITY

## 2019-04-16 RX ORDER — ATENOLOL 50 MG/1
TABLET ORAL
Refills: 0 | Status: ACTIVE | COMMUNITY

## 2019-04-16 RX ORDER — LIDOCAINE 50 MG/G
PATCH CUTANEOUS
Refills: 0 | Status: ACTIVE | COMMUNITY

## 2019-04-16 RX ORDER — PRIMIDONE 50 MG/1
TABLET ORAL
Refills: 0 | Status: ACTIVE | COMMUNITY

## 2019-06-24 ENCOUNTER — APPOINTMENT (OUTPATIENT)
Dept: ORTHOPEDIC SURGERY | Facility: CLINIC | Age: 79
End: 2019-06-24
Payer: MEDICARE

## 2019-06-24 DIAGNOSIS — M54.31 SCIATICA, RIGHT SIDE: ICD-10-CM

## 2019-06-24 PROCEDURE — 99214 OFFICE O/P EST MOD 30 MIN: CPT

## 2019-06-24 RX ORDER — DICLOFENAC SODIUM 75 MG/1
75 TABLET, DELAYED RELEASE ORAL
Qty: 90 | Refills: 0 | Status: ACTIVE | COMMUNITY
Start: 2019-06-24 | End: 1900-01-01

## 2019-06-24 NOTE — DISCUSSION/SUMMARY
[de-identified] : Patient is suffering from signs and symptoms of spinal stenosis.\par She's tried different modalities including injections, and physical therapy.\par She is decreased ambulation.\par She an MRI many years ago according to the patient of the lumbar spine.\par I would like to order an MRI of her lumbar spine as she is getting worse.\par Diclofenac was prescribed.\par She follow up after the MRI.\par All questions were answered, all alternatives discussed and the patient is in complete agreement with that plan. Follow-up appointment as instructed. Any issues and the patient will call or come in sooner.

## 2019-06-24 NOTE — PHYSICAL EXAM
[Normal] : Gait: normal [de-identified] : She is clunkiness from her bilateral knee replacements otherwise, 5 out of 5 motor strength, sensation is intact and symmetrical full range of motion flexion extension and rotation, no palpatory tenderness full range of motion of hips knees shoulders and elbows (all four extremities), no atrophy, negative straight leg raise, no pathological reflexes, no swelling, normal ambulation, no apparent distress skin is intact, no palpable lymph nodes, no upper or lower extremity instability, alert and oriented x3 and normal mood. Normal finger-to nose test.  [de-identified] : She does have an MRI report of the cervical spine which does not read spinal cord compression at any level.

## 2019-06-24 NOTE — HISTORY OF PRESENT ILLNESS
[8] : an average pain level of 8/10 [Stable] : stable [de-identified] : 79y/o female presents with low back pain that radiates down the right leg for over 10 years; which has been worsening. \par Patient states she did PT; last session was in December 2018, which helped. \par She had injections in her low spine by Dr. Garcia; last one was March 2019, which helped a little for several days. \par Patient states she has difficulty walking due to severe pain. Walking worsens the pain. \par MRI cervical/thoracic done 6/07/18. She is here with an aid.\par No fever chills sweats nausea vomiting no bowel or bladder dysfunction, no recent weight loss or gain no night pain. This history is in addition to the intake form that I personally reviewed. \par

## 2019-07-01 ENCOUNTER — APPOINTMENT (OUTPATIENT)
Dept: ORTHOPEDIC SURGERY | Facility: CLINIC | Age: 79
End: 2019-07-01
Payer: MEDICARE

## 2019-07-01 VITALS
SYSTOLIC BLOOD PRESSURE: 157 MMHG | BODY MASS INDEX: 33.26 KG/M2 | WEIGHT: 165 LBS | HEIGHT: 59 IN | HEART RATE: 72 BPM | DIASTOLIC BLOOD PRESSURE: 80 MMHG

## 2019-07-01 PROCEDURE — 99214 OFFICE O/P EST MOD 30 MIN: CPT

## 2019-07-01 NOTE — REASON FOR VISIT
[Follow-Up Visit] : a follow-up visit for [Initial Visit] : an initial visit for [Back Pain] : back pain

## 2019-07-01 NOTE — DISCUSSION/SUMMARY
[de-identified] : Patient is suffering from signs and symptoms of spinal stenosis.\par She's tried different modalities including injections, and physical therapy.\par She is decreased ambulation.\par Sending to Dr. Merino for LESI\par We discussed surgery but she would like to hold off on surgery and try an injection.\par She an MRI many years ago according to the patient of the lumbar spine.\par I would like to order an MRI of her lumbar spine as she is getting worse\par All options discussed including rest, medicine, home exercise, acupuncture, Chiropractic care, Physical Therapy, Pain management, and last resort surgery. \par All questions were answered, all alternatives discussed and the patient is in complete agreement with that plan. Follow-up appointment as instructed. Any issues and the patient will call or come in sooner.

## 2019-07-01 NOTE — HISTORY OF PRESENT ILLNESS
[Stable] : stable [8] : an average pain level of 8/10 [de-identified] : Ms Sherwood is a 77y/o female presents with persistent right lumbar radiculopathy. \par She has back pain into bilateral buttocks and down the right leg into the lateral thigh and calf.\par Has MRI lumbar\par Taking Meloxicam- helps little \par Patient states that she has trouble walking. \par No fever chills sweats nausea vomiting no bowel or bladder dysfunction, no recent weight loss or gain no night pain. This history is in addition to the intake form that I personally reviewed.  \par

## 2019-07-01 NOTE — PHYSICAL EXAM
[Normal] : Gait: normal [de-identified] : She is clunkiness from her bilateral knee replacements otherwise, 5 out of 5 motor strength, sensation is intact and symmetrical full range of motion flexion extension and rotation, no palpatory tenderness full range of motion of hips knees shoulders and elbows (all four extremities), no atrophy, negative straight leg raise, no pathological reflexes, no swelling, normal ambulation, no apparent distress skin is intact, no palpable lymph nodes, no upper or lower extremity instability, alert and oriented x3 and normal mood. Normal finger-to nose test.  [de-identified] : \par IMPRESSION:\par \par Multilevel degenerative disc disease and facet joint arthrosis as described. There is moderate narrowing of the spinal canal at L4-5 and L5-S1 levels with neural foraminal narrowing as described. Possible contact of the exiting right L4, descending bilateral L5 and S1 nerve roots as described.\par \par CLINICAL INDICATION: Chronic low back pain radiating to the right\par \par TECHNIQUE: Multiplanar multisequence MRI of the lumbar spine was performed without the administration of intravenous contrast, according to standard protocol. Examination performed on a 1.5 Sharda magnet\par \par COMPARISON: None\par \par FINDINGS:\par \par ALIGNMENT: Straightening of normal lumbar lordosis with mild levoconvex lumbar curvature. Minimal retrolisthesis present at L2-3 and L3-4\par \par VERTEBRAE: Vertebral heights maintained. Possible small hemangioma at L3\par \par DISCS: Mild multilevel disc desiccation with disc space narrowing at L1-2, L2-3 and L3-4 levels.\par \par CONUS MEDULLARIS AND CAUDA EQUINA: The conus medullaris terminates at L1-2. Conus appears to maintain normal signal intensity morphology\par \par PARAVERTEBRAL SOFT TISSUES AND VISUALIZED RETROPERITONEUM: Suggestion of extensive retroperitoneal fat. Mild fatty atrophy of the paraspinal musculature posteriorly\par \par EVALUATION OF INDIVIDUAL LEVELS:\par L1-2: Moderate bilateral facet joint arthrosis. Small circumferential disc bulge present. No spinal stenosis. No neural foraminal narrowing present.\par \par L2-3: Moderate circumferential disc bulge eccentric to the right with suggestion of superimposed right foraminal and extraforaminal disc protrusion as well as moderate to severe bilateral facet joint arthrosis with ligamentous infolding present. This produces mild narrowing of the spinal canal. There is no significant neural foraminal narrowing. There is equivocal contact of the exiting right L2 nerve root.\par \par L3-4: Large circumferential disc bulge eccentric to the right as well as moderate bilateral facet joint arthrosis with ligamentous infolding. Findings produce mild narrowing of the spinal canal. There is effacement of the subarticular recesses bilaterally. There is mild bilateral neural foraminal narrowing present.\par \par L4-5: Moderate circumferential disc bulge and severe bilateral facet joint arthrosis and ligamentous infolding produces moderate narrowing of the spinal canal. There is no significant left neural foraminal narrowing. There is mild to moderate right neural foraminal narrowing present. There is possible contact of the exiting right L4 nerve root however. In addition, there may be contact of both descending L5 nerve roots.\par \par L5-S1: Mild circumferential disc bulge as well as moderate bilateral facet joint arthrosis present. There is moderate narrowing of the spinal canal. No significant or foraminal narrowing is present. There is however possible contact of the descending bilateral S1 nerve roots.\par \par LIMITED EVALUATION OF UPPER SACRUM AND SACROILIAC JOINTS: Gibson mild degenerative change bilateral sacroiliac joints\par \par Electronic Signature: I personally reviewed the images and agree with this report. Final Report: Dictated by and Signed by Attending Hasmukh Boyce MD 6/27/2019 3:38 PM\par \par \par [DO NOT LEAVE IT IS NOT REAL]\par \par \par © 2015 [employer] \par She does have an MRI report of the cervical spine which does not read spinal cord compression at any level.

## 2019-07-22 ENCOUNTER — APPOINTMENT (OUTPATIENT)
Dept: ORTHOPEDIC SURGERY | Facility: CLINIC | Age: 79
End: 2019-07-22

## 2019-08-07 ENCOUNTER — APPOINTMENT (OUTPATIENT)
Dept: PHYSICAL MEDICINE AND REHAB | Facility: CLINIC | Age: 79
End: 2019-08-07

## 2020-10-30 ENCOUNTER — APPOINTMENT (OUTPATIENT)
Dept: OBGYN | Facility: CLINIC | Age: 80
End: 2020-10-30
Payer: MEDICARE

## 2020-10-30 VITALS — BODY MASS INDEX: 35.14 KG/M2 | SYSTOLIC BLOOD PRESSURE: 138 MMHG | DIASTOLIC BLOOD PRESSURE: 76 MMHG | WEIGHT: 174 LBS

## 2020-10-30 DIAGNOSIS — R10.2 PELVIC AND PERINEAL PAIN: ICD-10-CM

## 2020-10-30 DIAGNOSIS — I10 ESSENTIAL (PRIMARY) HYPERTENSION: ICD-10-CM

## 2020-10-30 PROCEDURE — 99202 OFFICE O/P NEW SF 15 MIN: CPT

## 2020-10-30 NOTE — HISTORY OF PRESENT ILLNESS
[FreeTextEntry1] : here for pelvic pain\sohan had abd hysterectomy and AP repair and sacrospinous fixation 4 years ago at [facility], since then she has pelivic pain and pressure\par has seen primary doctors for it \par

## 2020-10-30 NOTE — PHYSICAL EXAM
[Labia Majora] : normal [Labia Minora] : normal [Normal] : normal [Atrophy] : atrophy [Absent] : absent [Uterine Adnexae] : normal

## 2020-11-09 ENCOUNTER — APPOINTMENT (OUTPATIENT)
Dept: OBGYN | Facility: CLINIC | Age: 80
End: 2020-11-09

## 2021-02-16 NOTE — ED PROVIDER NOTE - OBJECTIVE STATEMENT
Pt is 76 y/o female with PMHx of HTN, HLD, DM (diet controlled), diverticulitis here for eval of LLQ x 2 wks. Pt states that she has been having increasing pain, initially dull and intermittent, now constant, increased associated with nausea but no vomiting. Pt denies any fever but admits to chills, also reports poor PO intake as she is afraid to eat. Pt saw GI doc last week (Dr Briceño) and was rxed cipro and flagyl which she has been taking, however admits to skipping the dosages as she has not been able to tolerate it. (-) urinary sx, denies diarrhea or constipation. sx feel like sx pt experienced with diverticulitis. pcp - Dr Cleo Rosales.
Performed

## 2022-10-20 ENCOUNTER — APPOINTMENT (OUTPATIENT)
Dept: NEUROSURGERY | Facility: CLINIC | Age: 82
End: 2022-10-20

## 2022-10-20 VITALS
DIASTOLIC BLOOD PRESSURE: 70 MMHG | WEIGHT: 171 LBS | SYSTOLIC BLOOD PRESSURE: 141 MMHG | HEIGHT: 59 IN | OXYGEN SATURATION: 98 % | TEMPERATURE: 98 F | BODY MASS INDEX: 34.47 KG/M2 | HEART RATE: 59 BPM

## 2022-10-20 DIAGNOSIS — M54.9 DORSALGIA, UNSPECIFIED: ICD-10-CM

## 2022-10-20 PROCEDURE — 99203 OFFICE O/P NEW LOW 30 MIN: CPT

## 2022-10-20 NOTE — PHYSICAL EXAM
[General Appearance - Alert] : alert [General Appearance - In No Acute Distress] : in no acute distress [Oriented To Time, Place, And Person] : oriented to person, place, and time [Impaired Insight] : insight and judgment were intact [Affect] : the affect was normal [Straight-Leg Raise Test - Right] : straight leg raise of the right leg was positive [Pain / Temp Decrease Lateral Leg & Dorsum Of Foot Right Only] : L5 sensory impairment [Pain / Temp Decrease Sole Of Foot & Posterior Leg Left Only] : S1 sensory impairment [5] : 5/5 Ankle Plantar Flexion (S1)

## 2022-10-21 ENCOUNTER — APPOINTMENT (OUTPATIENT)
Dept: NEUROSURGERY | Facility: CLINIC | Age: 82
End: 2022-10-21

## 2022-10-21 NOTE — REVIEW OF SYSTEMS
[As Noted in HPI] : as noted in HPI [Leg Weakness] : leg weakness [Numbness] : numbness [Tingling] : tingling [Abnormal Sensation] : an abnormal sensation [Difficulty Walking] : difficulty walking [Negative] : Heme/Lymph [de-identified] : low back pain

## 2022-10-21 NOTE — RESULTS/DATA
[FreeTextEntry1] : IMPRESSION:  MRI of the lumbar spine demonstrates:\par 1. At L5-S1 there is severe bilateral facet arthrosis and grade 1 degenerative anterolisthesis resulting in bilateral lateral recess stenosis, not significantly changed\par 2. At L4-L5, there is disc bulging encroaching upon the L4 foramina, right more than left, associated with mild posterior element hypertrophy mildly compromising the canal, not significantly changed\par 3. Minor disc bulges and mild facet arthropathy noted at the remaining lumbar intervertebral disc levels, not significantly changed

## 2022-10-21 NOTE — ASSESSMENT
[FreeTextEntry1] : Mrs. Sherwood is an 83 y/o, female, with lumbar stenosis  L4-5, L5-S1 and lumbosacral radiculopathy on R. Will obtain xray L spine with flexion/extension to r/o instability, and of R hip to r/o OA. Continue with physical therapy and f/u once imaging is completed.

## 2022-10-21 NOTE — HISTORY OF PRESENT ILLNESS
[FreeTextEntry1] : low back pain  [de-identified] : 10/20/22: Mrs. Sherwood is an 83 y/o, female, with a hx of HTN, HLD, DM II, TKR b/l, and chronic low back pain, here to establish care as a new patient. She reports her low back pain has been worsening over the past 5 years. Her low back pain spans across b/l sides then radiates posteriorly down R leg to ankle. She has numbness and tingling of legs, worse on R. She rates her pain a 6/10. Her pain is worsened by walking and standing. She can only walk 2 blocks. She has received multiple injections to spine, undergone PT, and been tx by chiropractor without relief of symptoms. She is currently on gabapentin.

## 2022-11-08 ENCOUNTER — APPOINTMENT (OUTPATIENT)
Dept: NEUROSURGERY | Facility: CLINIC | Age: 82
End: 2022-11-08

## 2022-11-08 VITALS
DIASTOLIC BLOOD PRESSURE: 62 MMHG | HEIGHT: 59 IN | TEMPERATURE: 98.2 F | WEIGHT: 171 LBS | BODY MASS INDEX: 34.47 KG/M2 | SYSTOLIC BLOOD PRESSURE: 121 MMHG | OXYGEN SATURATION: 98 % | HEART RATE: 61 BPM

## 2022-11-08 DIAGNOSIS — M48.061 SPINAL STENOSIS, LUMBAR REGION WITHOUT NEUROGENIC CLAUDICATION: ICD-10-CM

## 2022-11-08 DIAGNOSIS — M48.00 SPINAL STENOSIS, SITE UNSPECIFIED: ICD-10-CM

## 2022-11-08 DIAGNOSIS — M48.07 SPINAL STENOSIS, LUMBOSACRAL REGION: ICD-10-CM

## 2022-11-08 DIAGNOSIS — M54.16 RADICULOPATHY, LUMBAR REGION: ICD-10-CM

## 2022-11-08 PROCEDURE — 99213 OFFICE O/P EST LOW 20 MIN: CPT

## 2022-11-08 NOTE — PHYSICAL EXAM
[General Appearance - Alert] : alert [General Appearance - In No Acute Distress] : in no acute distress [Oriented To Time, Place, And Person] : oriented to person, place, and time [Impaired Insight] : insight and judgment were intact [Affect] : the affect was normal [Straight-Leg Raise Test - Right] : straight leg raise of the right leg was positive [Pain / Temp Decrease Lateral Leg & Dorsum Of Foot Right Only] : L5 sensory impairment [Pain / Temp Decrease Sole Of Foot & Posterior Leg Left Only] : S1 sensory impairment [No Deficits] : no sensory deficits [5] : 5/5 Ankle Plantar Flexion (S1)

## 2022-11-09 NOTE — DATA REVIEWED
[de-identified] : MRI lumbar spine R 10/01/2022 [de-identified] : XRAY LUMBAR SPINE AND RIGHT HIP.

## 2022-11-09 NOTE — REVIEW OF SYSTEMS
[As Noted in HPI] : as noted in HPI [Leg Weakness] : leg weakness [Numbness] : numbness [Tingling] : tingling [Abnormal Sensation] : an abnormal sensation [Difficulty Walking] : difficulty walking [Negative] : Heme/Lymph [de-identified] : low back pain

## 2022-11-09 NOTE — HISTORY OF PRESENT ILLNESS
[FreeTextEntry1] : low back pain  [de-identified] : 11/18/2022: Mrs. Sherwood is an 83 y/o, female, here for f/u to review xray lumbar spine and R hip. She continues to have unresolved low back pain radiating to LE however, her most bothersome symptom is her R buttock pain radiating to foot. She rates her pain a 6/10. She continues to have numbness and tingling of LE. She continues to wear abdominal brace to provide her with support. She stated she has been getting frustrated due to her pain being triggered by her usual daily activities. She is currently undergoing physical therapy x 2 week. \par \par 10/20/22: Mrs. Sherwood is an 83 y/o, female, with a hx of HTN, HLD, DM II, TKR b/l, and chronic low back pain, here to establish care as a new patient. She reports her low back pain has been worsening over the past 5 years. Her low back pain spans across b/l sides then radiates posteriorly down R leg to ankle. She has numbness and tingling of legs, worse on R. She rates her pain a 6/10. Her pain is worsened by walking and standing. She can only walk 2 blocks. She has received multiple injections to spine, undergone PT, and been tx by chiropractor without relief of symptoms. She is currently on gabapentin.

## 2022-11-09 NOTE — ASSESSMENT
[FreeTextEntry1] : Mrs. Sherwood is an 81 y/o, female, with spondylolisthesis, lumbar stenosis  L4-5, L5-S1 and lumbosacral radiculopathy on R. Referral for pain management for R hip injection and transforaminal block provided. Pt to continue with physical therapy in the meantime. No surgical intervention at this time.

## 2022-11-09 NOTE — RESULTS/DATA
[FreeTextEntry1] : *** MRI LUMBAR SPINE \par IMPRESSION:  MRI of the lumbar spine demonstrates:\par 1. At L5-S1 there is severe bilateral facet arthrosis and grade 1 degenerative anterolisthesis resulting in bilateral lateral recess stenosis, not significantly changed\par 2. At L4-L5, there is disc bulging encroaching upon the L4 foramina, right more than left, associated with mild posterior element hypertrophy mildly compromising the canal, not significantly changed\par 3. Minor disc bulges and mild facet arthropathy noted at the remaining lumbar intervertebral disc levels, not significantly changed\par \par ***XRAY lUMBAR SPINE\par IMPRESSION: \par 1. There is grade 1 spondylolisthesis of L5 over S1 on a degenerative basis. This is unchanged compared to the prior exam. This appears stable on flexion and extension positioning.\par 2. Mild spondylosis is seen throughout the lumbar spine. There is facet joint arthrosis in the lower lumbar spine.\par \par

## 2024-11-11 NOTE — H&P ADULT. - NEGATIVE OPHTHALMOLOGIC SYMPTOMS
[Patient reported PAP Smear was normal] : Patient reported PAP Smear was normal [Frequency: Q ___ days] : menstrual periods occur approximately every [unfilled] days [Currently Active] : currently active [Men] : men [No] : No [Patient refuses STI testing] : Patient refuses STI testing [PapSmeardate] : 03/2021 [FreeTextEntry1] : 11/3/2024 no diplopia/no photophobia

## 2025-03-13 NOTE — H&P ADULT. - RESPIRATORY
Dear Sima Manning,     After reviewing your responses, I would like you to come in for a urine test to make sure we treat you correctly. This urine test is to evaluate you for a possible urinary tract infection, and should be scheduled for today or tomorrow. Schedule a Lab Only appointment here.     Lab appointments are not available at most locations on the weekends. If no Lab Only appointment is available, you should be seen in any of our convenient Walk-in or Urgent Care Centers, which can be found on our website here.     You will receive instructions with your results in [employer] once they are available.     If your symptoms worsen, you develop pain in your back or stomach, develop fevers, or are not improving in 5 days, please contact your primary care provider for an appointment or visit a Walk-in or Urgent Care Center to be seen.     Thanks again for choosing us as your health care partner,     Jeny Cox PA-C   detailed exam